# Patient Record
Sex: FEMALE | Race: WHITE | Employment: PART TIME | URBAN - METROPOLITAN AREA
[De-identification: names, ages, dates, MRNs, and addresses within clinical notes are randomized per-mention and may not be internally consistent; named-entity substitution may affect disease eponyms.]

---

## 2017-01-03 ENCOUNTER — HOSPITAL ENCOUNTER (EMERGENCY)
Facility: CLINIC | Age: 22
Discharge: HOME | End: 2017-01-03
Attending: EMERGENCY MEDICINE | Admitting: EMERGENCY MEDICINE
Payer: COMMERCIAL

## 2017-01-03 ENCOUNTER — APPOINTMENT (OUTPATIENT)
Dept: GENERAL RADIOLOGY | Facility: CLINIC | Age: 22
End: 2017-01-03
Attending: EMERGENCY MEDICINE
Payer: COMMERCIAL

## 2017-01-03 VITALS
HEIGHT: 64 IN | RESPIRATION RATE: 16 BRPM | TEMPERATURE: 97.8 F | WEIGHT: 140 LBS | OXYGEN SATURATION: 99 % | BODY MASS INDEX: 23.9 KG/M2 | HEART RATE: 78 BPM | SYSTOLIC BLOOD PRESSURE: 142 MMHG | DIASTOLIC BLOOD PRESSURE: 78 MMHG

## 2017-01-03 DIAGNOSIS — R07.9 ACUTE CHEST PAIN: ICD-10-CM

## 2017-01-03 LAB
ANION GAP SERPL CALCULATED.3IONS-SCNC: 8 MMOL/L (ref 3–14)
BASOPHILS # BLD AUTO: 0 10E9/L (ref 0–0.2)
BASOPHILS NFR BLD AUTO: 0.4 %
BUN SERPL-MCNC: 13 MG/DL (ref 7–30)
CALCIUM SERPL-MCNC: 8.6 MG/DL (ref 8.5–10.1)
CHLORIDE SERPL-SCNC: 106 MMOL/L (ref 94–109)
CO2 SERPL-SCNC: 28 MMOL/L (ref 20–32)
CREAT SERPL-MCNC: 0.75 MG/DL (ref 0.52–1.04)
DIFFERENTIAL METHOD BLD: NORMAL
EOSINOPHIL # BLD AUTO: 0.1 10E9/L (ref 0–0.7)
EOSINOPHIL NFR BLD AUTO: 1.4 %
ERYTHROCYTE [DISTWIDTH] IN BLOOD BY AUTOMATED COUNT: 11.8 % (ref 10–15)
GFR SERPL CREATININE-BSD FRML MDRD: NORMAL ML/MIN/1.7M2
GLUCOSE SERPL-MCNC: 96 MG/DL (ref 70–99)
HCG SERPL QL: NEGATIVE
HCT VFR BLD AUTO: 38.8 % (ref 35–47)
HGB BLD-MCNC: 13.3 G/DL (ref 11.7–15.7)
IMM GRANULOCYTES # BLD: 0 10E9/L (ref 0–0.4)
IMM GRANULOCYTES NFR BLD: 0.1 %
LYMPHOCYTES # BLD AUTO: 2.4 10E9/L (ref 0.8–5.3)
LYMPHOCYTES NFR BLD AUTO: 26.7 %
MCH RBC QN AUTO: 30.5 PG (ref 26.5–33)
MCHC RBC AUTO-ENTMCNC: 34.3 G/DL (ref 31.5–36.5)
MCV RBC AUTO: 89 FL (ref 78–100)
MONOCYTES # BLD AUTO: 0.7 10E9/L (ref 0–1.3)
MONOCYTES NFR BLD AUTO: 7.5 %
NEUTROPHILS # BLD AUTO: 5.7 10E9/L (ref 1.6–8.3)
NEUTROPHILS NFR BLD AUTO: 63.9 %
NRBC # BLD AUTO: 0 10*3/UL
NRBC BLD AUTO-RTO: 0 /100
PLATELET # BLD AUTO: 243 10E9/L (ref 150–450)
POTASSIUM SERPL-SCNC: 3.8 MMOL/L (ref 3.4–5.3)
RBC # BLD AUTO: 4.36 10E12/L (ref 3.8–5.2)
SODIUM SERPL-SCNC: 142 MMOL/L (ref 133–144)
WBC # BLD AUTO: 9 10E9/L (ref 4–11)

## 2017-01-03 PROCEDURE — 71020 XR CHEST 2 VW: CPT

## 2017-01-03 PROCEDURE — 93005 ELECTROCARDIOGRAM TRACING: CPT

## 2017-01-03 PROCEDURE — 84703 CHORIONIC GONADOTROPIN ASSAY: CPT | Performed by: EMERGENCY MEDICINE

## 2017-01-03 PROCEDURE — 85025 COMPLETE CBC W/AUTO DIFF WBC: CPT | Performed by: EMERGENCY MEDICINE

## 2017-01-03 PROCEDURE — 80048 BASIC METABOLIC PNL TOTAL CA: CPT | Performed by: EMERGENCY MEDICINE

## 2017-01-03 PROCEDURE — 99285 EMERGENCY DEPT VISIT HI MDM: CPT | Mod: 25

## 2017-01-03 ASSESSMENT — ENCOUNTER SYMPTOMS
CHILLS: 0
FEVER: 0
TROUBLE SWALLOWING: 0
SORE THROAT: 0
ABDOMINAL PAIN: 0
WEAKNESS: 0
WOUND: 0
NUMBNESS: 1
RHINORRHEA: 0
COUGH: 0
SHORTNESS OF BREATH: 0

## 2017-01-03 NOTE — ED AVS SNAPSHOT
[facility] Emergency Department    [address]    [facility] [address]    Phone:  [phone]    Fax:  [phone]                                       Brittney Boykin   MRN: [id number]    Department:  [facility] Emergency Department   Date of Visit:  1/3/2017           After Visit Summary Signature Page     I have received my discharge instructions, and my questions have been answered. I have discussed any challenges I see with this plan with the nurse or doctor.    ..........................................................................................................................................  Patient/Patient Representative Signature      ..........................................................................................................................................  Patient Representative Print Name and Relationship to Patient    ..................................................               ................................................  Date                                            Time    ..........................................................................................................................................  Reviewed by Signature/Title    ...................................................              ..............................................  Date                                                            Time

## 2017-01-03 NOTE — ED PROVIDER NOTES
History     Chief Complaint:  Chest Pain    HPI   Brittney Boykin is a 21 year old otherwise healthy female who presents to the emergency department today for evaluation of chest pain. The patient reports 3-4 days ago she developed left sided chest pain that radiates down her right arm as well as tingling in her right fingers. Her symptoms have been intermittent and last for approximately 15-20 minutes at a time and is worse after eating a mean. Upon arrival, she is completely asymptomatic. She states she presents to the ED since the pain is in her chest. There is no change with palpation and she denies leg or arm swelling, fever, chills, cough, cold symptoms, abdominal pain, weakness, and any abnormal lumps on her breast or skin.     Cardiac/PE/DVT Risk Factors:  History of hypertension - Negative  History of hyperlipidemia - Negative  History of diabetes - Negative  History of smoking - Negative  Hormones - Negative  Personal history of PE/DVT - Negative  Family history of PE/DVT - Negative  Family history of heart complications - Negative  Recent travel - Negative  Recent surgery - Negative  Other immobilizations - Negative  Cancer - Negative    Allergies:  No Known Drug Allergies     Medications:    The patient is currently on no regular medications.     Past Medical History:    Vitamin D deficiency      Past Surgical History:    History reviewed. No pertinent past surgical history.    Family History:    History reviewed. No pertinent family history.     Social History:  The patient was accompanied to the ED by her boyfriend.  Smoking Status: Never  Alcohol Use: Yes  Marital Status:  Single      Review of Systems   Constitutional: Negative for fever and chills.   HENT: Negative for congestion, rhinorrhea, sneezing, sore throat and trouble swallowing.    Respiratory: Negative for cough and shortness of breath.    Cardiovascular: Positive for chest pain (Radiate left arm).   Gastrointestinal: Negative for  "abdominal pain.   Skin: Negative for rash and wound.   Neurological: Positive for numbness (Tinglignn right fingers). Negative for weakness.   All other systems reviewed and are negative.    Physical Exam   First Vitals:  BP: (!) 150/93 mmHg  Pulse: 93  Temp: 97.8  F (36.6  C)  Resp: 20  Height: 162.6 cm (5' 4\")  Weight: 63.504 kg (140 lb)  SpO2: 100 %      Physical Exam  General: Patient is alert and interactive when I enter the room  Head:  The scalp, face, and head appear normal  Eyes:  The pupils are equal, round, and reactive to light    Conjunctivae and sclerae are normal  ENT:    External acoustic canals are normal    The oropharynx is normal without erythema.     Uvula is in the midline  Neck:  Normal range of motion  CV:  Regular rate. S1/S2. No murmurs.     Peripheral pulses including radial pulses are symmetric  Resp:  Lungs are clear without wheezes or rales. No distress  GI:  Abdomen is soft, no rigidity, guarding, or rebound    No distension. No tenderness to palpation in any quadrant.     MS:  Normal tone. Joints grossly normal without effusions.     No asymmetric leg swelling, calf or thigh tenderness.      Normal motor assessment of all extremities.    Chest wall is non tender to palpation.      No arm swelling. Pulses normal. Capillary refill 2 seconds.   Skin:  No rash or lesions noted. Normal capillary refill noted  Neuro: Speech is normal and fluent. Face is symmetric.     Moving all extremities well.   Psych:  Awake. Alert.  Normal affect.  Appropriate interactions.  Lymph: No anterior cervical lymphadenopathy noted    Emergency Department Course     ECG:  ECG taken at 1700, ECG read at 1723  Normal sinus rhythm  Septal infarct age undetermined  Abnormal ECG  Rate 96 bpm. NJ interval 152. QRS duration 82. QT/QTc 344/434. P-R-T axes 19 68 37.    Imaging:  Radiology findings were communicated with the patient who voiced understanding of the findings.  XR Chest 2 Views  IMPRESSION: Clear " lungs.  Final reading per radiology    Laboratory:  Laboratory findings were communicated with the patient who voiced understanding of the findings.  CBC: WNL. (WBC 9.0, HGB 13.3, )   BMP: AWNL (Creatinine 0.75)  HCG Qualitative Serum: Negative    Emergency Department Course:  Nursing notes and vitals reviewed.  I performed an exam of the patient as documented above.   The patient was sent for a XR Chest 2 Views while in the emergency department, results above.   IV was inserted and blood was drawn for laboratory testing, results above.  1720: I initially examined the patient.   1840: Patient rechecked and updated on the imaging and laboratory results.   I discussed the treatment plan with the patient. They expressed understanding of this plan and consented to discharge. They will be discharged home with instructions for care and follow up. In addition, the patient will return to the emergency department if their symptoms persist, worsen, if new symptoms arise or if there is any concern.  All questions were answered.  I personally reviewed the laboratory and imaging results with the Patient and significant other and answered all related questions prior to discharge.    Impression & Plan      Medical Decision Making:  Brittney Boykin presents with chest pain.  The work up in the Emergency Department is negative.  The differential diagnosis of chest pain is broad and includes life threatening etiologies such as Acute coronary syndrome, Myocardial infarction, Pulmonary Embolism, and Acute Aortic Dissection.  Other causes may include pneumonia, pneumothorax, pericarditis, pleurisy, and esophageal spasm.  No serious etiology for the chest pain was detected today during this visit.  PERC negative.     Close follow up with primary care is indicated should the pain continue, as further work up may be performed; this was made clear to Brittney, who understands.    Diagnosis:    ICD-10-CM    1. Acute chest pain R07.9       Disposition:   Discharged to home    Scribe Disclosure:  I, Lawanda Ulloafredis, am serving as a scribe at 5:22 PM on 1/3/2017 to document services personally performed by Jluis Lucero MD, based on my observations and the provider's statements to me.    1/3/2017   [facility] EMERGENCY DEPARTMENT        Jluis Lucero MD  01/03/17 1950

## 2017-01-03 NOTE — ED NOTES
Chest pain intermittent left sided radiates into shoulder, minimal pain on arrival. No medical hx.

## 2017-01-03 NOTE — ED AVS SNAPSHOT
[facility] Emergency Department    [address] [place] [address]    Phone:  [phone]    Fax:  [phone]                                       Brittney Boykin   MRN: [id number]    Department:  [facility] Emergency Department   Date of Visit:  1/3/2017           Patient Information     Date Of Birth          1995        Your diagnoses for this visit were:     Acute chest pain        You were seen by Jluis Lucero MD.      Follow-up Information     Follow up with [facility], [place].    Specialty:  Family Practice    Why:  If symptoms worsen    Contact information:    [address] [place] [address]  [phone]          Discharge Instructions       Discharge Instructions  Chest Pain    You have been seen today for chest pain or discomfort.  At this time, your doctor has found no signs that your chest pain is due to a serious or life-threatening condition, (or you have declined more testing and/or admission to the hospital). However, sometimes there is a serious problem that does not show up right away. Your evaluation today may not be complete and you may need further testing and evaluation.     You need to follow-up with your regular doctor within 3 days.    Return to the Emergency Department if:    Your chest pain changes, gets worse, starts to happen more often, or comes with less activity.    You are short of breath.    You get very weak or tired.    You pass out or faint.    You have any new symptoms, like fever, cough, numb legs, or you cough up blood.    You have anything else that worries you.    Until you follow-up with your regular doctor please do the following:    Take one aspirin daily unless you have an allergy or are told not to by your doctor.    If a stress test appointment has been made, go to the appointment.    If you have questions, contact your regular doctor.    If your doctor today has told you to follow-up  with your regular doctor, it is very important that you make an appointment with your clinic and go to the appointment.  If you do not follow-up with your primary doctor, it may result in missing an important development which could result in permanent injury or disability and/or lasting pain.  If there is any problem keeping your appointment, call your doctor or return to the Emergency Department.    If you were given a prescription for medicine here today, be sure to read all of the information (including the package insert) that comes with your prescription.  This will include important information about the medicine, its side effects, and any warnings that you need to know about.  The pharmacist who fills the prescription can provide more information and answer questions you may have about the medicine.  If you have questions or concerns that the pharmacist cannot address, please call or return to the Emergency Department.           24 Hour Appointment Hotline       To make an appointment at any [facility], call [phone] ([phone]). If you don't have a family doctor or clinic, we will help you find one. [facility] are conveniently located to serve the needs of you and your family.             Review of your medicines      Notice     You have not been prescribed any medications.            Procedures and tests performed during your visit     Basic metabolic panel    CBC with platelets differential    EKG 12 lead    HCG qualitative    XR Chest 2 Views      Orders Needing Specimen Collection     None      Pending Results     Date and Time Order Name Status Description    1/3/2017 1728 XR Chest 2 Views Preliminary     1/3/2017 1710 EKG 12 lead Preliminary             Pending Culture Results     No orders found from 1/2/2017 to 1/4/2017.       Test Results from your hospital stay           1/3/2017  5:54 PM - Interface, Flexilab Results      Component Results     Component Value Ref Range &  Units Status    WBC 9.0 4.0 - 11.0 10e9/L Final    RBC Count 4.36 3.8 - 5.2 10e12/L Final    Hemoglobin 13.3 11.7 - 15.7 g/dL Final    Hematocrit 38.8 35.0 - 47.0 % Final    MCV 89 78 - 100 fl Final    MCH 30.5 26.5 - 33.0 pg Final    MCHC 34.3 31.5 - 36.5 g/dL Final    RDW 11.8 10.0 - 15.0 % Final    Platelet Count 243 150 - 450 10e9/L Final    Diff Method Automated Method  Final    % Neutrophils 63.9 % Final    % Lymphocytes 26.7 % Final    % Monocytes 7.5 % Final    % Eosinophils 1.4 % Final    % Basophils 0.4 % Final    % Immature Granulocytes 0.1 % Final    Nucleated RBCs 0 0 /100 Final    Absolute Neutrophil 5.7 1.6 - 8.3 10e9/L Final    Absolute Lymphocytes 2.4 0.8 - 5.3 10e9/L Final    Absolute Monocytes 0.7 0.0 - 1.3 10e9/L Final    Absolute Eosinophils 0.1 0.0 - 0.7 10e9/L Final    Absolute Basophils 0.0 0.0 - 0.2 10e9/L Final    Abs Immature Granulocytes 0.0 0 - 0.4 10e9/L Final    Absolute Nucleated RBC 0.0  Final         1/3/2017  6:11 PM - Interface, Flexilab Results      Component Results     Component Value Ref Range & Units Status    Sodium 142 133 - 144 mmol/L Final    Potassium 3.8 3.4 - 5.3 mmol/L Final    Chloride 106 94 - 109 mmol/L Final    Carbon Dioxide 28 20 - 32 mmol/L Final    Anion Gap 8 3 - 14 mmol/L Final    Glucose 96 70 - 99 mg/dL Final    Urea Nitrogen 13 7 - 30 mg/dL Final    Creatinine 0.75 0.52 - 1.04 mg/dL Final    GFR Estimate >90  Non  GFR Calc   >60 mL/min/1.7m2 Final    GFR Estimate If Black >90   GFR Calc   >60 mL/min/1.7m2 Final    Calcium 8.6 8.5 - 10.1 mg/dL Final         1/3/2017  6:25 PM - Interface, Radiant Ib      Narrative     CHEST TWO VIEW 1/3/2017 6:17 PM     COMPARISON: None.    HISTORY: Evaluate for infiltrate, widened mediastinum, other  abnormality.    FINDINGS: The cardiac silhouette, pulmonary vasculature, lungs and  pleural spaces are within normal limits.        Impression     IMPRESSION: Clear lungs.         1/3/2017  6:18  PM - Interface, Flexilab Results      Component Results     Component Value Ref Range & Units Status    HCG Qualitative Serum Negative NEG Final                Clinical Quality Measure: Blood Pressure Screening     Your blood pressure was checked while you were in the emergency department today. The last reading we obtained was  BP: (!) 150/93 mmHg . Please read the guidelines below about what these numbers mean and what you should do about them.  If your systolic blood pressure (the top number) is less than 120 and your diastolic blood pressure (the bottom number) is less than 80, then your blood pressure is normal. There is nothing more that you need to do about it.  If your systolic blood pressure (the top number) is 120-139 or your diastolic blood pressure (the bottom number) is 80-89, your blood pressure may be higher than it should be. You should have your blood pressure rechecked within a year by a primary care provider.  If your systolic blood pressure (the top number) is 140 or greater or your diastolic blood pressure (the bottom number) is 90 or greater, you may have high blood pressure. High blood pressure is treatable, but if left untreated over time it can put you at risk for heart attack, stroke, or kidney failure. You should have your blood pressure rechecked by a primary care provider within the next 4 weeks.  If your provider in the emergency department today gave you specific instructions to follow-up with your doctor or provider even sooner than that, you should follow that instruction and not wait for up to 4 weeks for your follow-up visit.        Thank you for choosing [facility]       Thank you for choosing [facility] for your care. Our goal is always to provide you with excellent care. Hearing back from our patients is one way we can continue to improve our services. Please take a few minutes to complete the written survey that you may receive in the mail after you visit with us. Thank you!       "  MyChart Information     [employer] lets you send messages to your doctor, view your test results, renew your prescriptions, schedule appointments and more. To sign up, go to [email or web address] . Click on \"Log in\" on the left side of the screen, which will take you to the Welcome page. Then click on \"Sign up Now\" on the right side of the page.     You will be asked to enter the access code listed below, as well as some personal information. Please follow the directions to create your username and password.     Your access code is: [email or web address]  Expires: 4/3/2017  6:43 PM     Your access code will  in 90 days. If you need help or a new code, please call your [facility] clinic or [phone].        Care EveryWhere ID     This is your Care EveryWhere ID. This could be used by other organizations to access your [facility] medical records  [id number]        After Visit Summary       This is your record. Keep this with you and show to your community pharmacist(s) and doctor(s) at your next visit.                  "

## 2017-01-04 LAB — INTERPRETATION ECG - MUSE: NORMAL

## 2017-01-04 NOTE — DISCHARGE INSTRUCTIONS
Discharge Instructions  Chest Pain    You have been seen today for chest pain or discomfort.  At this time, your doctor has found no signs that your chest pain is due to a serious or life-threatening condition, (or you have declined more testing and/or admission to the hospital). However, sometimes there is a serious problem that does not show up right away. Your evaluation today may not be complete and you may need further testing and evaluation.     You need to follow-up with your regular doctor within 3 days.    Return to the Emergency Department if:    Your chest pain changes, gets worse, starts to happen more often, or comes with less activity.    You are short of breath.    You get very weak or tired.    You pass out or faint.    You have any new symptoms, like fever, cough, numb legs, or you cough up blood.    You have anything else that worries you.    Until you follow-up with your regular doctor please do the following:    Take one aspirin daily unless you have an allergy or are told not to by your doctor.    If a stress test appointment has been made, go to the appointment.    If you have questions, contact your regular doctor.    If your doctor today has told you to follow-up with your regular doctor, it is very important that you make an appointment with your clinic and go to the appointment.  If you do not follow-up with your primary doctor, it may result in missing an important development which could result in permanent injury or disability and/or lasting pain.  If there is any problem keeping your appointment, call your doctor or return to the Emergency Department.    If you were given a prescription for medicine here today, be sure to read all of the information (including the package insert) that comes with your prescription.  This will include important information about the medicine, its side effects, and any warnings that you need to know about.  The pharmacist who fills the prescription can  provide more information and answer questions you may have about the medicine.  If you have questions or concerns that the pharmacist cannot address, please call or return to the Emergency Department.

## 2019-01-01 ENCOUNTER — HOSPITAL ENCOUNTER (EMERGENCY)
Facility: CLINIC | Age: 24
Discharge: HOME | End: 2019-01-01
Attending: EMERGENCY MEDICINE | Admitting: EMERGENCY MEDICINE
Payer: COMMERCIAL

## 2019-01-01 VITALS
TEMPERATURE: 97.1 F | OXYGEN SATURATION: 99 % | BODY MASS INDEX: 33.32 KG/M2 | RESPIRATION RATE: 18 BRPM | DIASTOLIC BLOOD PRESSURE: 97 MMHG | HEART RATE: 92 BPM | HEIGHT: 65 IN | SYSTOLIC BLOOD PRESSURE: 146 MMHG | WEIGHT: 200 LBS

## 2019-01-01 DIAGNOSIS — R11.2 NON-INTRACTABLE VOMITING WITH NAUSEA, UNSPECIFIED VOMITING TYPE: ICD-10-CM

## 2019-01-01 LAB
ANION GAP SERPL CALCULATED.3IONS-SCNC: 5 MMOL/L (ref 3–14)
BUN SERPL-MCNC: 7 MG/DL (ref 7–30)
CALCIUM SERPL-MCNC: 8.5 MG/DL (ref 8.5–10.1)
CHLORIDE SERPL-SCNC: 109 MMOL/L (ref 94–109)
CO2 SERPL-SCNC: 27 MMOL/L (ref 20–32)
CREAT SERPL-MCNC: 0.8 MG/DL (ref 0.52–1.04)
ERYTHROCYTE [DISTWIDTH] IN BLOOD BY AUTOMATED COUNT: 12.1 % (ref 10–15)
GFR SERPL CREATININE-BSD FRML MDRD: >90 ML/MIN/{1.73_M2}
GLUCOSE SERPL-MCNC: 103 MG/DL (ref 70–99)
HCG SERPL QL: NEGATIVE
HCT VFR BLD AUTO: 42.3 % (ref 35–47)
HGB BLD-MCNC: 14.6 G/DL (ref 11.7–15.7)
MCH RBC QN AUTO: 30.4 PG (ref 26.5–33)
MCHC RBC AUTO-ENTMCNC: 34.5 G/DL (ref 31.5–36.5)
MCV RBC AUTO: 88 FL (ref 78–100)
PLATELET # BLD AUTO: 311 10E9/L (ref 150–450)
POTASSIUM SERPL-SCNC: 3.9 MMOL/L (ref 3.4–5.3)
RBC # BLD AUTO: 4.8 10E12/L (ref 3.8–5.2)
SODIUM SERPL-SCNC: 141 MMOL/L (ref 133–144)
WBC # BLD AUTO: 7.4 10E9/L (ref 4–11)

## 2019-01-01 PROCEDURE — 99285 EMERGENCY DEPT VISIT HI MDM: CPT | Mod: 25

## 2019-01-01 PROCEDURE — 84703 CHORIONIC GONADOTROPIN ASSAY: CPT | Performed by: EMERGENCY MEDICINE

## 2019-01-01 PROCEDURE — 80048 BASIC METABOLIC PNL TOTAL CA: CPT | Performed by: EMERGENCY MEDICINE

## 2019-01-01 PROCEDURE — 25000131 ZZH RX MED GY IP 250 OP 636 PS 637: Performed by: EMERGENCY MEDICINE

## 2019-01-01 PROCEDURE — 96361 HYDRATE IV INFUSION ADD-ON: CPT

## 2019-01-01 PROCEDURE — 96374 THER/PROPH/DIAG INJ IV PUSH: CPT

## 2019-01-01 PROCEDURE — 25000128 H RX IP 250 OP 636: Performed by: EMERGENCY MEDICINE

## 2019-01-01 PROCEDURE — 85027 COMPLETE CBC AUTOMATED: CPT | Performed by: EMERGENCY MEDICINE

## 2019-01-01 PROCEDURE — 96375 TX/PRO/DX INJ NEW DRUG ADDON: CPT

## 2019-01-01 RX ORDER — ONDANSETRON 4 MG/1
4 TABLET, ORALLY DISINTEGRATING ORAL ONCE
Status: COMPLETED | OUTPATIENT
Start: 2019-01-01 | End: 2019-01-01

## 2019-01-01 RX ORDER — SODIUM CHLORIDE 9 MG/ML
1000 INJECTION, SOLUTION INTRAVENOUS CONTINUOUS
Status: DISCONTINUED | OUTPATIENT
Start: 2019-01-01 | End: 2019-01-01 | Stop reason: HOSPADM

## 2019-01-01 RX ORDER — ONDANSETRON 4 MG/1
4 TABLET, ORALLY DISINTEGRATING ORAL EVERY 6 HOURS PRN
Qty: 15 TABLET | Refills: 0 | Status: SHIPPED | OUTPATIENT
Start: 2019-01-01 | End: 2019-01-04

## 2019-01-01 RX ORDER — DIPHENHYDRAMINE HYDROCHLORIDE 50 MG/ML
25 INJECTION INTRAMUSCULAR; INTRAVENOUS ONCE
Status: COMPLETED | OUTPATIENT
Start: 2019-01-01 | End: 2019-01-01

## 2019-01-01 RX ORDER — METOCLOPRAMIDE HYDROCHLORIDE 5 MG/ML
10 INJECTION INTRAMUSCULAR; INTRAVENOUS ONCE
Status: COMPLETED | OUTPATIENT
Start: 2019-01-01 | End: 2019-01-01

## 2019-01-01 RX ORDER — ONDANSETRON 4 MG/1
4 TABLET, ORALLY DISINTEGRATING ORAL
Status: COMPLETED | OUTPATIENT
Start: 2019-01-01 | End: 2019-01-01

## 2019-01-01 RX ADMIN — SODIUM CHLORIDE 1000 ML: 9 INJECTION, SOLUTION INTRAVENOUS at 08:29

## 2019-01-01 RX ADMIN — METOCLOPRAMIDE 10 MG: 5 INJECTION, SOLUTION INTRAMUSCULAR; INTRAVENOUS at 09:12

## 2019-01-01 RX ADMIN — ONDANSETRON 4 MG: 4 TABLET, ORALLY DISINTEGRATING ORAL at 07:38

## 2019-01-01 RX ADMIN — DIPHENHYDRAMINE HYDROCHLORIDE 25 MG: 50 INJECTION INTRAMUSCULAR; INTRAVENOUS at 09:11

## 2019-01-01 RX ADMIN — SODIUM CHLORIDE 1000 ML: 9 INJECTION, SOLUTION INTRAVENOUS at 07:53

## 2019-01-01 RX ADMIN — ONDANSETRON 4 MG: 4 TABLET, ORALLY DISINTEGRATING ORAL at 09:10

## 2019-01-01 ASSESSMENT — ENCOUNTER SYMPTOMS
VOMITING: 1
FEVER: 0
DIARRHEA: 0
DIZZINESS: 1
ABDOMINAL PAIN: 0

## 2019-01-01 ASSESSMENT — MIFFLIN-ST. JEOR: SCORE: 1668.07

## 2019-01-01 NOTE — ED AVS SNAPSHOT
[facility] Emergency Department  [address]  [facility] [address]  Phone:  [phone]  Fax:  [phone]                                    Brittney Boykin   MRN: [id number]    Department:  [facility] Emergency Department   Date of Visit:  1/1/2019           After Visit Summary Signature Page    I have received my discharge instructions, and my questions have been answered. I have discussed any challenges I see with this plan with the nurse or doctor.    ..........................................................................................................................................  Patient/Patient Representative Signature      ..........................................................................................................................................  Patient Representative Print Name and Relationship to Patient    ..................................................               ................................................  Date                                   Time    ..........................................................................................................................................  Reviewed by Signature/Title    ...................................................              ..............................................  Date                                               Time          22EPIC Rev 08/18

## 2019-01-01 NOTE — ED PROVIDER NOTES
"  History     Chief Complaint:    Vomiting    HPI   Brittney Boykin is a 23 year old female who presents with vomiting. The patient reports that last night she was consuming large amount of alcohol, around 9 shots, and started to vomit around midnight. Since then, the patient has felt dizzy and is unable to keep anything down.  The patient denies diarrhea, fever, abdominal pain. She has not found any alleviating or aggravating factors. Her last menstrual cycle was 2 weeks ago and as her normal.     Allergies:  Codeine; possibly     Medications:    No current medications.     Past Medical History:    Medical history reviewed. No pertinent medical history.      Past Surgical History:    Past surgical history reviewed. No pertinent past surgical hist     Family History:    Family history reviewed. No pertinent family history.     Social History:  The patient was accompanied to the ED by friend.  Smoking Status: Never Smoker  Smokeless Tobacco: Never Used  Alcohol Use: Positive  Drug Use: Negative   Marital Status:  Single      Review of Systems   Constitutional: Negative for fever.   Gastrointestinal: Positive for vomiting. Negative for abdominal pain and diarrhea.   Neurological: Positive for dizziness.   All other systems reviewed and are negative.    Physical Exam     Patient Vitals for the past 24 hrs:   BP Temp Temp src Pulse Resp SpO2 Height Weight   01/01/19 1017 -- -- -- 92 -- -- -- --   01/01/19 0733 (!) 146/97 97.1  F (36.2  C) Temporal 119 18 99 % 1.651 m (5' 5\") 90.7 kg (200 lb)        Physical Exam    Constitutional:  Pleasant, age appropriate female holding an emesis basin to her mouth visibly nauseated.  HEENT:    Oropharynx is moist  Eyes:    Conjunctiva normal  Neck:    Supple, no meningismus.     CV:     Regular rate and rhythm.      No murmurs, rubs or gallops.     No lower extremity edema.  PULM:    Clear to auscultation bilateral.       No respiratory distress.      Good air exchange.     No " rales or wheezing.  ABD:    Soft, non-distended.       No abdominal tenderness.     Bowel sounds normal.     No pulsatile masses.       No rebound, guarding or rigidity.     No CVA tenderness.   MSK:     No gross deformity to all four extremities.   LYMPH:   No cervical lymphadenopathy.  NEURO:   Alert.  Good muscular tone, no atrophy.   Skin:    Warm, dry and intact.    Psych:    Mood is good and affect is appropriate.      Emergency Department Course     Laboratory:  Laboratory findings were communicated with the patient who voiced understanding of the findings.    CBC: WBC 7.4, HGB 14.6,   BMP: Glucose 103 (H), WNL (Creatinine 0.80)  HCG Qualitative: Negative    Interventions:  0738 Zofran 4 mg PO  0753 NS 1000 mL IV  0829 NS 1000 mL IV  0910 Zofran 4 mg PO  0911 Benadryl 25 mg IV  0912 Reglan 10 mg IV    Emergency Department Course:    0733 Nursing notes and vitals reviewed.    0751 IV was inserted and blood was drawn for laboratory testing, results above.     0836 I performed an exam of the patient as documented above.     1004 I personally reviewed the lab results with the patient and answered all related questions prior to discharge.    Impression & Plan      Medical Decision Making:  Brittney Boykin is a 23 year old female who presents to the emergency department today for evaluation of vomiting after excessive alcohol consumption last night.  Abdominal examination is benign with no concerns of intraabdominal catastrophe or obstructive findings.  No indication for advanced imaging.  Basic laboratory studies are reassuring.  Patient improved with antiemetics.  Patient safe for discharge home with Zofran as needed.  Continue oral rehydration at home.  Patient counseled on safe alcohol use.     Diagnosis:    ICD-10-CM    1. Non-intractable vomiting with nausea, unspecified vomiting type R11.2      Disposition:   The patient is discharged to home.     Discharge Medications:     Review of your medicines       START taking      Dose / Directions   ondansetron 4 MG ODT tab  Commonly known as:  ZOFRAN ODT      Dose:  4 mg  Take 1 tablet (4 mg) by mouth every 6 hours as needed for nausea  Quantity:  15 tablet  Refills:  0           Where to get your medicines      Some of these will need a paper prescription and others can be bought over the counter. Ask your nurse if you have questions.    Bring a paper prescription for each of these medications    ondansetron 4 MG ODT tab         Scribe Disclosure:  I, Orla Severson, am serving as a scribe at 8:38 AM on 1/1/2019 to document services personally performed by Brennan Mcgregor MD based on my observations and the provider's statements to me.      [facility] EMERGENCY DEPARTMENT       Brennan Mcgregor MD  01/01/19 8226

## 2019-01-01 NOTE — ED TRIAGE NOTES
Drinking last night, c/o nausea and vomiting that started around midnight. Continues to vomit. Denies diarrhea. C/o feeling dizzy with position changes

## 2019-01-01 NOTE — DISCHARGE INSTRUCTIONS
Discharge Instructions  Vomiting    You have been seen today for vomiting (throwing up). This is usually caused by a virus, but some bacteria, parasites, medicines or other medical conditions can cause similar symptoms. At this time your provider does not find that your vomiting is a sign of anything dangerous or life-threatening. However, sometimes the signs of serious illness do not show up right away. If you have new or worse symptoms, you may need to be seen again in the Emergency Department or by your primary provider. Remember that serious problems like appendicitis can start as vomiting.    Generally, every Emergency Department visit should have a follow-up clinic visit with either a primary or a specialty clinic/provider. Please follow-up as instructed by your emergency provider today.    Return to the Emergency Department if:  You keep vomiting and you are not able to keep liquids down.   You feel you are getting dehydrated, such as being very thirsty, not urinating (peeing) at least every 8-12 hours, or feeling faint or lightheaded.   You develop a new fever, or your fever continues for more than 2 days.   You have abdominal (belly pain) that seems worse than cramps, is in one spot, or is getting worse over time. Appendicitis usually causes pain in the right lower abdomen (to the right and below your belly button) so watch for pain in this location.  You have blood in your vomit or stools.   You feel very weak.  You are not starting to improve within 24 hours of your visit here.     What can I do to help myself?  The most important thing to do is to drink clear liquids. If you have been vomiting a lot, it is best to have only small, frequent sips of liquids. Drinking too much at once may cause more vomiting. If you are vomiting often, you must replace minerals, sodium and potassium lost with your illness. Pedialyte  is the best available rehydration liquid but some find that it doesn?t taste good so sports  drinks are an alterative. You can also drink clear liquids such as water, weak tea, apple juice, and 7-Up . Avoid acid liquids (orange), caffeine (coffee) or alcohol. Do not drink milk until you no longer have diarrhea (loose stools).   After liquids are staying down, you may start eating mild foods. Soda crackers, toast, plain noodles, gelatin, applesauce and bananas are good first choices. Avoid foods that have acid, are spicy, fatty or have a lot of fiber (such as meats, coarse grains, vegetables). You may start eating these foods again in about 3 days when you are better.   Sometimes treatment includes prescription medicine to prevent nausea (sick to your stomach) and vomiting. If your provider prescribes these for you, take them as directed.   Do not take ibuprofen, naproxen, or other nonsteroidal anti-inflammatory (NSAID) medicines without checking with your healthcare provider.     If you were given a prescription for medicine here today, be sure to read all of the information (including the package insert) that comes with your prescription.  This will include important information about the medicine, its side effects, and any warnings that you need to know about.  The pharmacist who fills the prescription can provide more information and answer questions you may have about the medicine.  If you have questions or concerns that the pharmacist cannot address, please call or return to the Emergency Department.     Remember that you can always come back to the Emergency Department if you are not able to see your regular provider in the amount of time listed above, if you get any new symptoms, or if there is anything that worries you.

## 2019-01-01 NOTE — ED NOTES
Patient able to tolerate PO intake. MD in to see patient and discuss diagnosis, test results, and discharge plan. Patient meets discharge criteria. Discussed AVS with patient. Questions answered. Patient verbalized understanding. Patient reports being ready to go home. Patient discharged home with friend by car with all necessary information.

## 2019-03-01 ENCOUNTER — HOSPITAL ENCOUNTER (EMERGENCY)
Facility: CLINIC | Age: 24
Discharge: HOME | End: 2019-03-02
Attending: EMERGENCY MEDICINE | Admitting: EMERGENCY MEDICINE
Payer: COMMERCIAL

## 2019-03-01 VITALS
HEART RATE: 98 BPM | OXYGEN SATURATION: 99 % | RESPIRATION RATE: 16 BRPM | WEIGHT: 180 LBS | SYSTOLIC BLOOD PRESSURE: 153 MMHG | BODY MASS INDEX: 29.95 KG/M2 | DIASTOLIC BLOOD PRESSURE: 101 MMHG | TEMPERATURE: 98.3 F

## 2019-03-01 DIAGNOSIS — H66.002 ACUTE SUPPURATIVE OTITIS MEDIA OF LEFT EAR WITHOUT SPONTANEOUS RUPTURE OF TYMPANIC MEMBRANE, RECURRENCE NOT SPECIFIED: ICD-10-CM

## 2019-03-01 PROCEDURE — 99282 EMERGENCY DEPT VISIT SF MDM: CPT

## 2019-03-01 RX ORDER — LEVOTHYROXINE SODIUM 25 UG/1
25 TABLET ORAL DAILY
COMMUNITY

## 2019-03-01 NOTE — ED AVS SNAPSHOT
[facility] Emergency Department  [address]  [facility] [address]  Phone:  [phone]  Fax:  [phone]                                    Brittney Boykin   MRN: [id number]    Department:  [facility] Emergency Department   Date of Visit:  3/1/2019           After Visit Summary Signature Page    I have received my discharge instructions, and my questions have been answered. I have discussed any challenges I see with this plan with the nurse or doctor.    ..........................................................................................................................................  Patient/Patient Representative Signature      ..........................................................................................................................................  Patient Representative Print Name and Relationship to Patient    ..................................................               ................................................  Date                                   Time    ..........................................................................................................................................  Reviewed by Signature/Title    ...................................................              ..............................................  Date                                               Time          22EPIC Rev 08/18

## 2019-03-02 RX ORDER — AMOXICILLIN 500 MG/1
500 CAPSULE ORAL 3 TIMES DAILY
Qty: 21 CAPSULE | Refills: 0 | Status: SHIPPED | OUTPATIENT
Start: 2019-03-02 | End: 2019-03-09

## 2019-03-02 NOTE — ED PROVIDER NOTES
History     Chief Complaint:  Otalgia      HPI   Brittney Boykin is a 23 year old female who presents for evaluation of left sided otalgia for the past 3 days and has gotten progressively worse. The pain used to remain in one location but now radiates down her left mandible. No  Fever or drainage from her left ear and she has noticed that it more difficult to hear out of her left ear as well. Of note, the patient used to get frequent ear infections as a child but never had tubes placed     Allergies:  Codeine     Medications:    Levothyroxine  Vitamin D3    Past Medical History:    Hyperthyroidism  FEDERICO  Vitamin D deficiency    Past Surgical History:    History reviewed. No pertinent surgical history.    Family History:    Cancer   Hypertension    Social History:  Smoking status: Never smoker  Alcohol use: Yes  Marital Status:  Single [1]       Review of Systems   HENT: Positive for ear pain. Negative for ear discharge.    All other systems reviewed and are negative.        Physical Exam     Patient Vitals for the past 24 hrs:   BP Temp Temp src Pulse Heart Rate Resp SpO2 Weight   03/01/19 2335 -- -- -- -- -- -- 99 % 81.6 kg (180 lb)   03/01/19 2252 (!) 153/101 98.3  F (36.8  C) Temporal 98 98 16 99 % --         Physical Exam   Constitutional: She appears well-developed and well-nourished.   HENT:   Right Ear: External ear normal.   Left Ear: External ear normal.   Nose: Nose normal.   Mouth/Throat: Oropharynx is clear and moist.   L EAC filled with yellow waxy debris, unable to see TM initially.  After irrigation, RM hazy appearing with some mild redness around the edge. No TTP over mastoid.   Eyes: Conjunctivae and EOM are normal. Pupils are equal, round, and reactive to light.   Neck: Normal range of motion. Neck supple.   Cardiovascular: Normal rate, regular rhythm, normal heart sounds and intact distal pulses.   No murmur heard.  Pulmonary/Chest: Effort normal and breath sounds normal. No respiratory  distress.   Lymphadenopathy:     She has cervical adenopathy.   Neurological: She is alert.   Skin: Skin is warm and dry.   Psychiatric: She has a normal mood and affect.         Emergency Department Course       Emergency Department Course:  Past medical records, nursing notes, and vitals reviewed.  2340: I performed an exam of the patient and obtained history, as documented above.    0025: I rechecked the patient. Findings and plan explained to the Patient. Patient discharged home with instructions regarding supportive care, medications, and reasons to return. The importance of close follow-up was reviewed.          Impression & Plan      Medical Decision Making:  Patient presents with left ear pain for 3 days. On exam she is non toxic appearing, I do not see any evidence of otitis externa, there is no evidence or mastoiditis as she is not tender in those particular areas. She has large amounts of debris blocking the view of her left TM. We irrigated it and she felt that her hearing was improved after that. Her left TM was slightly erythematous on the edge and there is fluid behind there that was somewhat opaque so likely she may have an infection as well as fluid build up. I did recommend using sudafed if she feels the need to do that. Benadryl at night and she is asked to start amoxicillin today and do it for 7 days. She need to be rechecked by her doctor in 3-4 days if symptoms are not improving after medication. Otherwise patient is comfortable with starting to use motrin and tylenol for pain as well amoxicillin. Diagnosis left otitis media       Diagnosis:    ICD-10-CM    1. Acute suppurative otitis media of left ear without spontaneous rupture of tympanic membrane, recurrence not specified H66.002        Disposition:  discharged to home    Discharge Medications:     Medication List      Started    amoxicillin 500 MG capsule  Commonly known as:  AMOXIL  500 mg, Oral, 3 TIMES DAILY           Richard  Altagracia  3/1/2019   [facility] EMERGENCY DEPARTMENT    Scribe Disclosure:  I, Richard Frias, am serving as a scribe at 11:40 PM on 3/1/2019 to document services personally performed by Smith Vega MD based on my observations and the provider's statements to me.          Smith Vega MD  03/02/19 0719

## 2020-02-16 ENCOUNTER — HOSPITAL ENCOUNTER (EMERGENCY)
Facility: CLINIC | Age: 25
Discharge: HOME | End: 2020-02-17
Attending: EMERGENCY MEDICINE | Admitting: EMERGENCY MEDICINE
Payer: COMMERCIAL

## 2020-02-16 DIAGNOSIS — H66.92 LEFT OTITIS MEDIA, UNSPECIFIED OTITIS MEDIA TYPE: ICD-10-CM

## 2020-02-16 PROCEDURE — 99282 EMERGENCY DEPT VISIT SF MDM: CPT

## 2020-02-16 NOTE — ED AVS SNAPSHOT
[facility] Emergency Department  [address]  [facility] [address]  Phone:  [phone]  Fax:  [phone]                                    Brittney Boykin   MRN: [id number]    Department:  [facility] Emergency Department   Date of Visit:  2/16/2020           After Visit Summary Signature Page    I have received my discharge instructions, and my questions have been answered. I have discussed any challenges I see with this plan with the nurse or doctor.    ..........................................................................................................................................  Patient/Patient Representative Signature      ..........................................................................................................................................  Patient Representative Print Name and Relationship to Patient    ..................................................               ................................................  Date                                   Time    ..........................................................................................................................................  Reviewed by Signature/Title    ...................................................              ..............................................  Date                                               Time          22EPIC Rev 08/18

## 2020-02-17 VITALS
HEART RATE: 71 BPM | RESPIRATION RATE: 18 BRPM | BODY MASS INDEX: 36.06 KG/M2 | TEMPERATURE: 98.2 F | WEIGHT: 216.71 LBS | DIASTOLIC BLOOD PRESSURE: 90 MMHG | SYSTOLIC BLOOD PRESSURE: 154 MMHG | OXYGEN SATURATION: 98 %

## 2020-02-17 RX ORDER — CEFDINIR 300 MG/1
300 CAPSULE ORAL 2 TIMES DAILY
Qty: 14 CAPSULE | Refills: 0 | Status: SHIPPED | OUTPATIENT
Start: 2020-02-17 | End: 2020-05-06

## 2020-02-17 RX ORDER — IBUPROFEN 200 MG
600 TABLET ORAL EVERY 6 HOURS PRN
Qty: 30 TABLET | Refills: 0 | Status: SHIPPED | OUTPATIENT
Start: 2020-02-17 | End: 2020-05-06

## 2020-02-17 ASSESSMENT — ENCOUNTER SYMPTOMS
RHINORRHEA: 0
COUGH: 0
FEVER: 0

## 2020-02-17 NOTE — ED TRIAGE NOTES
Pt arrives to the ED due to left ear pain. Pt states that she saw a virtual MD and they prescribed ear drops for ear infection. Has been taking for 4 days. Pain increasing. Ibuprofen an hour ago.

## 2020-02-17 NOTE — ED PROVIDER NOTES
History     Chief Complaint:  Otalgia      The history is provided by the patient.      Brittney Boykin is a 24 year old female who presents with a friend for evaluation of otalgia. The patient has left ear otalgia which she believes to be an ear infection because this episode is similar to past ear infections. She has been using Ofloxacin drops the past few days without relief. She denies fever, cough, rhinorrhea, or ear trauma.  She has not taken anything for analgesia.    Allergies:  Codeine    Medications:    Levothyroxine   Pepcid    Past Medical History:    Anxiety  Depression  Hypothyroidism  Obesity  Vitamin D deficiency  Otitis media    Past Surgical History:    [place] teeth extraction    Family History:    Deafness  Hypertension  Hyperlipidemia  Prostate cancer    Social History:  Marital Status:  Single [1]  Presents with a friend.  Negative for tobacco use.  Positive for alcohol use.   Negative for drug use.     Review of Systems   Constitutional: Negative for fever.   HENT: Positive for ear pain. Negative for rhinorrhea.    Respiratory: Negative for cough.    All other systems reviewed and are negative.    Physical Exam     Patient Vitals for the past 24 hrs:   BP Temp Temp src Pulse Heart Rate Resp SpO2 Weight   02/16/20 2329 (!) 161/100 97.9  F (36.6  C) Oral 79 79 18 99 % 98.3 kg (216 lb 11.4 oz)       Physical Exam  Nursing note and vitals reviewed.  Constitutional: Well nourished. Resting comfortably.   Eyes: Conjunctiva normal.  Pupils are equal, round, and reactive to light.   ENT: Nose normal. Mucous membranes pink and moist.  L. TM with erythema and slight effusion; no external ear tenderness/otorrhea; R. TM normal. No mastoid tenderness.  Uvula midline. No posterior oropharyngeal erythema/exudate.   Neck: Normal range of motion.  CVS: Normal rate, regular rhythm.  Normal heart sounds.  No murmur.  Pulmonary: Lungs clear to auscultation bilaterally. No wheezes/rales/rhonchi.  GI: Abdomen  "soft. Nontender, nondistended. No rigidity or guarding.    MSK: No calf tenderness or swelling.  Neuro: Alert. Follows simple commands.  Skin: Skin is warm and dry. No rash noted.   Psychiatric: Normal affect.       Emergency Department Course     Procedures:  None    Emergency Department Course:  Past medical records, nursing notes, and vitals reviewed.    2346 I performed an exam of the patient as documented above.     Findings and plan explained to the Patient. Patient discharged home with instructions regarding supportive care, medications, and reasons to return. The importance of close follow-up was reviewed. The patient was prescribed cefdinir and ibuprofen.    I personally answered all related questions prior to discharge.     Impression & Plan     Medical Decision Making:  Patient presents with L. Ear pain.  Nontoxic.  The patient has an exam consistent with acute otitis media.  There is no sign of mastoiditis, mass, dental abscess, or peritonsillar abscess. There is no evidence of otitis externa.  The patient has been on ofloxacin gtts, will be started on antibiotics and may take Tylenol or Ibuprofen for pain.  Patient reports she \"never responds\" to amoxicillin.  She reports she typically responds to cefdinir.  Return if increasing pain, fever, decrease in hearing or ear discharge that persists.  Follow-up with primary physician in 3-5 days, if symptoms persist.       Diagnosis:    ICD-10-CM    1. Left otitis media, unspecified otitis media type H66.92        Disposition:  Discharged to home.    Discharge Medications:  New Prescriptions    CEFDINIR (OMNICEF) 300 MG CAPSULE    Take 1 capsule (300 mg) by mouth 2 times daily for 7 days    IBUPROFEN (ADVIL/MOTRIN) 200 MG TABLET    Take 3 tablets (600 mg) by mouth every 6 hours as needed for pain       Scribe Disclosure:  Elke LUCERO, am serving as a scribe at 11:46 PM on 2/16/2020 to document services personally performed by Deb Malcolm DO " based on my observations and the provider's statements to me.     [facility] EMERGENCY DEPARTMENT       Deb Malcolm,   02/17/20 0017

## 2020-05-06 ENCOUNTER — NURSE TRIAGE (OUTPATIENT)
Dept: NURSING | Facility: CLINIC | Age: 25
End: 2020-05-06

## 2020-05-06 ENCOUNTER — APPOINTMENT (OUTPATIENT)
Dept: MRI IMAGING | Facility: CLINIC | Age: 25
End: 2020-05-06
Attending: EMERGENCY MEDICINE
Payer: COMMERCIAL

## 2020-05-06 ENCOUNTER — HOSPITAL ENCOUNTER (EMERGENCY)
Facility: CLINIC | Age: 25
Discharge: HOME | End: 2020-05-06
Attending: EMERGENCY MEDICINE | Admitting: EMERGENCY MEDICINE
Payer: COMMERCIAL

## 2020-05-06 VITALS
SYSTOLIC BLOOD PRESSURE: 151 MMHG | HEART RATE: 92 BPM | RESPIRATION RATE: 22 BRPM | DIASTOLIC BLOOD PRESSURE: 98 MMHG | OXYGEN SATURATION: 97 %

## 2020-05-06 DIAGNOSIS — M62.81 GENERALIZED MUSCLE WEAKNESS: ICD-10-CM

## 2020-05-06 DIAGNOSIS — R42 DIZZINESS: ICD-10-CM

## 2020-05-06 LAB
ANION GAP SERPL CALCULATED.3IONS-SCNC: 4 MMOL/L (ref 3–14)
BUN SERPL-MCNC: 11 MG/DL (ref 7–30)
CALCIUM SERPL-MCNC: 8.8 MG/DL (ref 8.5–10.1)
CHLORIDE SERPL-SCNC: 106 MMOL/L (ref 94–109)
CO2 SERPL-SCNC: 29 MMOL/L (ref 20–32)
CREAT SERPL-MCNC: 0.87 MG/DL (ref 0.52–1.04)
ERYTHROCYTE [DISTWIDTH] IN BLOOD BY AUTOMATED COUNT: 12.1 % (ref 10–15)
GFR SERPL CREATININE-BSD FRML MDRD: >90 ML/MIN/{1.73_M2}
GLUCOSE BLDC GLUCOMTR-MCNC: 107 MG/DL (ref 70–99)
GLUCOSE SERPL-MCNC: 109 MG/DL (ref 70–99)
HCG SERPL QL: NEGATIVE
HCT VFR BLD AUTO: 43.3 % (ref 35–47)
HGB BLD-MCNC: 14.5 G/DL (ref 11.7–15.7)
MCH RBC QN AUTO: 29.7 PG (ref 26.5–33)
MCHC RBC AUTO-ENTMCNC: 33.5 G/DL (ref 31.5–36.5)
MCV RBC AUTO: 89 FL (ref 78–100)
PLATELET # BLD AUTO: 317 10E9/L (ref 150–450)
POTASSIUM SERPL-SCNC: 3.5 MMOL/L (ref 3.4–5.3)
RBC # BLD AUTO: 4.88 10E12/L (ref 3.8–5.2)
SODIUM SERPL-SCNC: 139 MMOL/L (ref 133–144)
WBC # BLD AUTO: 10.5 10E9/L (ref 4–11)

## 2020-05-06 PROCEDURE — 93005 ELECTROCARDIOGRAM TRACING: CPT

## 2020-05-06 PROCEDURE — 00000146 ZZHCL STATISTIC GLUCOSE BY METER IP

## 2020-05-06 PROCEDURE — 70549 MR ANGIOGRAPH NECK W/O&W/DYE: CPT

## 2020-05-06 PROCEDURE — 70553 MRI BRAIN STEM W/O & W/DYE: CPT

## 2020-05-06 PROCEDURE — 80048 BASIC METABOLIC PNL TOTAL CA: CPT | Performed by: EMERGENCY MEDICINE

## 2020-05-06 PROCEDURE — 85027 COMPLETE CBC AUTOMATED: CPT | Performed by: EMERGENCY MEDICINE

## 2020-05-06 PROCEDURE — 25000132 ZZH RX MED GY IP 250 OP 250 PS 637: Performed by: EMERGENCY MEDICINE

## 2020-05-06 PROCEDURE — 25500064 ZZH RX 255 OP 636: Performed by: EMERGENCY MEDICINE

## 2020-05-06 PROCEDURE — A9585 GADOBUTROL INJECTION: HCPCS | Performed by: EMERGENCY MEDICINE

## 2020-05-06 PROCEDURE — 99285 EMERGENCY DEPT VISIT HI MDM: CPT | Mod: 25

## 2020-05-06 PROCEDURE — 84703 CHORIONIC GONADOTROPIN ASSAY: CPT | Performed by: EMERGENCY MEDICINE

## 2020-05-06 PROCEDURE — 70544 MR ANGIOGRAPHY HEAD W/O DYE: CPT | Mod: XS

## 2020-05-06 RX ORDER — GADOBUTROL 604.72 MG/ML
10 INJECTION INTRAVENOUS ONCE
Status: COMPLETED | OUTPATIENT
Start: 2020-05-06 | End: 2020-05-06

## 2020-05-06 RX ORDER — MECLIZINE HYDROCHLORIDE 25 MG/1
25 TABLET ORAL 3 TIMES DAILY PRN
Qty: 15 TABLET | Refills: 0 | Status: SHIPPED | OUTPATIENT
Start: 2020-05-06 | End: 2021-04-25

## 2020-05-06 RX ORDER — MECLIZINE HYDROCHLORIDE 25 MG/1
25 TABLET ORAL ONCE
Status: COMPLETED | OUTPATIENT
Start: 2020-05-06 | End: 2020-05-06

## 2020-05-06 RX ADMIN — MECLIZINE HYDROCHLORIDE 25 MG: 25 TABLET ORAL at 20:50

## 2020-05-06 RX ADMIN — GADOBUTROL 10 ML: 604.72 INJECTION INTRAVENOUS at 21:02

## 2020-05-06 ASSESSMENT — ENCOUNTER SYMPTOMS
DIZZINESS: 1
WEAKNESS: 1
VOMITING: 0
FEVER: 0
NAUSEA: 0
SENSORY CHANGE: 0

## 2020-05-06 NOTE — ED AVS SNAPSHOT
[facility] Emergency Department  [address]  [facility] [address]  Phone:  [phone]  Fax:  [phone]                                    Brittney Boykin   MRN: [id number]    Department:  [facility] Emergency Department   Date of Visit:  5/6/2020           After Visit Summary Signature Page    I have received my discharge instructions, and my questions have been answered. I have discussed any challenges I see with this plan with the nurse or doctor.    ..........................................................................................................................................  Patient/Patient Representative Signature      ..........................................................................................................................................  Patient Representative Print Name and Relationship to Patient    ..................................................               ................................................  Date                                   Time    ..........................................................................................................................................  Reviewed by Signature/Title    ...................................................              ..............................................  Date                                               Time          22EPIC Rev 08/18

## 2020-05-07 LAB — INTERPRETATION ECG - MUSE: NORMAL

## 2020-05-07 NOTE — ED NOTES
Pt called at [phone] to inform her that a prescription was left behind w/ her discharge. Pt did not answer but I left a message that did not include her name or identifying information in order to maintain HIPPA. Her prescription was placed in a labeled envelope w/ her name and number, as well as day she was seen, and given to janet Vega.

## 2020-05-07 NOTE — ED PROVIDER NOTES
"  History     Chief Complaint:  Right Sided Weakness     HPI:  Brittney Boykin is a 24 year old female who presents to the emergency department for evaluation of right sided weakness. The patient reports she was finishing up dinner about 30 minutes ago when she experienced abrupt onset of dizziness. The dizziness is described as \"lightheaded\" and less room spinning.  Her symptoms increased when standing and improved while lying down, but did not completely resolve. She then developed right sided weakness, and right sided facial numbness.  She feels better in relation to the dizziness and weakness when compared to the start of the symptoms although they persist. She denies recent head or neck trauma. She denies recent chiropractic manipulation.     Allergies:  Codeine     Medications:    Levothyroxine     Past Medical History:    Hypothyroidism    Past Surgical History:    The patient does not have any pertinent past surgical history.    Family History:    No past pertinent family history.    Social History:  Smoking Status: Never Smoker  Smokeless Tobacco: Never Used  Alcohol Use: Yes  Drug Use: No  PCP: [facility], [place]    Marital Status:  Single     Review of Systems   Constitutional: Negative for fever.   HENT: Negative for hearing loss and tinnitus.    Gastrointestinal: Negative for nausea and vomiting.   Neurological: Positive for dizziness and weakness. Negative for sensory change.   All other systems reviewed and are negative.      Physical Exam     Patient Vitals for the past 24 hrs:   BP Pulse Heart Rate Resp SpO2   05/06/20 2100 (!) 152/95 103 103 -- 99 %   05/06/20 2028 (!) 192/118 121 121 22 100 %       Physical Exam    Constitutional:  Pleasant, age appropriate.      Resting comfortably in the bed.  HEENT:    Tympanic membranes are clear and without effusion.     External auditory canals without cerumen impaction.     Oropharynx is moist, without lesions or trismus.  Eyes:    Conjunctiva " normal, PERRL     Extra-ocular movements intact.     No nystagmus     Visual fields intact  Neck:    Supple, no meningismus.     CV:     Tachycardic, regular rhythm.      No murmurs, rubs or gallops.       2+ radial pulses bilateral.       No lower extremity edema.  PULM:    Clear to auscultation bilateral.       No respiratory distress.      Good air exchange.     No rales or wheezing.  ABD:    Soft, non-tender, non-distended.       No pulsatile masses.       No rebound, guarding or rigidity.  MSK:     No gross deformity to all four extremities.   LYMPH:   No cervical lymphadenopathy.  NEURO:   Alert and oriented x 3.      Cranial nerves II-XII intact.     Strength is 5/5 in all 4 extremities.     Sensation intact to all 4 extremities.     Finger to nose normal bilateral     No clonus, down-going Babinski bilateral.     Test of skew normal.  Skin:    Warm, dry and intact.    Psych:    Mild anxious    National Institutes of Health Stroke Scale  Exam Interval: Baseline   Score    Level of consciousness: (0)   Alert, keenly responsive    LOC questions: (0)   Answers both questions correctly    LOC commands: (0)   Performs both tasks correctly    Best gaze: (0)   Normal    Visual: (0)   No visual loss    Facial palsy: (0)   Normal symmetrical movements    Motor arm (left): (0)   No drift    Motor arm (right): (0)   No drift    Motor leg (left): (0)   No drift    Motor leg (right): (0)   No drift    Limb ataxia: (0)   Absent    Sensory: (0)   Normal- no sensory loss    Best language: (0)   Normal- no aphasia    Dysarthria: (0)   Normal    Extinction and inattention: (0)   No abnormality        Total Score:  0         Emergency Department Course     ECG:  ECG taken at 2033, ECG read at 2035  Normal sinus rhythm  Normal ECG  No significant change compared to EKG dated 01/03/2017.   Rate 97 bpm. UT interval 152 ms. QRS duration 86 ms. QT/QTc 346/439 ms. P-R-T axes 32 65 28.    Imaging:  Radiology findings were communicated  with the patient who voiced understanding of the findings.    MRA Neck (Carotids) wo & w Contrast  IMPRESSION:  NECK MRA:  1.  No significant stenosis of the neck vessels by NASCET criteria. No finding for dissection.    Reading per radiology.      MR Brain w/o & w Contrast  IMPRESSION:  HEAD MRI:   1.  Unremarkable appearance to the brain with no finding for acute infarct, intracranial hemorrhage, extra-axial collection, or abnormally enhancing mass. No parenchymal signal abnormality.    Reading per radiology.      MRA Brain ([place]) wo Contrast  Final Result  IMPRESSION:  HEAD MRA:   1.  No significant intracranial stenosis. No aneurysm and no high flow vascular malformation.    Reading per radiology.     Laboratory:  Laboratory findings were communicated with the patient who voiced understanding of the findings.    Glucose by meter(2034): 107 (H)    HCG Qualitative Pregnancy: negative    BMP: Glucose 109 (H), o/w WNL (Creatinine: 0.87)    CBC: WBC: 10.5, HGB: 14.5, PLT: 317     Interventions:  2050 Meclizine 25 mg oral    Emergency Department Course:  Past medical records, nursing notes, and vitals reviewed.    2025 Patient arrived and neurological evaluation completed.     2029 I performed an exam of the patient as documented above.     EKG obtained in the ED, see results above.     IV was inserted and blood was drawn for laboratory testing, results above.    The patient was sent for a MR Brain, MRA Brain, and MRA Neck while in the emergency department, results above.     2223 I rechecked the patient and discussed the results of her workup thus far.     Findings and plan explained to the Patient. Patient discharged home with instructions regarding supportive care, medications, and reasons to return. The importance of close follow-up was reviewed. The patient was prescribed meclizine.     I personally reviewed the laboratory and imaging results with the Patient and answered all related questions prior  to discharge.     Impression & Plan     Medical Decision Makin-year-old female presented to the ED with primary complaints of dizziness described as positional lightheadedness but also subjective right-sided weakness.  Objectively she has no weakness nor neurological deficit.  She has no features concerning for central vertigo with the exception of the subjective right-sided weakness.  Basic laboratory studies were unremarkable.  MRI and MRA of the head and neck revealed no concerning pathology particularly to the posterior fossa.  With meclizine, she feels much improved.  Dizziness is likely related to peripheral vertigo.  The exact cause of the subjective weakness is uncertain but very low suspicion for TIA.  Patient safe for discharge home and close follow-up with primary care physician.  Return to the ED for any worsening symptoms.    Diagnosis:    ICD-10-CM    1. Dizziness  R42    2. Generalized muscle weakness  M62.81        Disposition:  Discharged to home.    Discharge Medications:  New Prescriptions    MECLIZINE (ANTIVERT) 25 MG TABLET    Take 1 tablet (25 mg) by mouth 3 times daily as needed for dizziness       Scribe Disclosure:  I, Dayan Pryor, am serving as a scribe at 8:36 PM on 2020 to document services personally performed by Brennan Mcgregor MD based on my observations and the provider's statements to me.      Brennan Mcgregor MD  [phone]

## 2020-05-07 NOTE — TELEPHONE ENCOUNTER
Brittney calls and says that she is dizzy and says that things are spinning. Symptom began 15 minutes ago. Pt. Says that she has been drinking fluids today. Pt. Refuses to call 911 and says that she is sitting in a car in the [facility] ER parking lot. RN then told pt. That she needs to have someone get her a wheelchair so she does not fall going into the ER. Pt. voiced understanding. RN then called that ER and spoke to Melva-. RN told Melva about pt's symptoms and impending arrival. Melva says that pt. Just entered the ER. COVID 19 Nurse Triage Plan/Patient Instructions    Please be aware that novel coronavirus (COVID-19) may be circulating in the community. If you develop symptoms such as fever, cough, or SOB or if you have concerns about the presence of another infection including coronavirus (COVID-19), please contact your health care provider or visit [email or web address].     Disposition/Instructions    Patient to go to ED and follow protocol based instructions. Follow System Ambulatory Workflow for COVID 19.     Bring Your Own Device:  Please also bring your smart device(s) (smart phones, tablets, laptops) and their charging cables for your personal use and to communicate with your care team during your visit.    Thank you for limiting contact with others, wearing a simple mask to cover your cough, practice good hand hygiene habits and accessing our virtual services where possible to limit the spread of this virus.    For more information about COVID19 and options for caring for yourself at home, please visit the CDC website at https://www.cdc.gov/coronavirus/2019-ncov/about/steps-when-sick.html  For more options for care at [facility], please visit our website at [email or web address]    For more information, please use the [place] Department of Health COVID-19 Website: https://www.health.state.[place]/diseases/coronavirus/index.html  [facility] ([facility]) COVID-19 Hotlines (Interpreters available):      Health questions: Phone Number: [phone] or 1-786.367.2133 and Hours: 7 a.m. to 7 p.m.    Schools and  questions: Phone Number: [phone] or [phone] and Hours 7 a.m. to 7 p.m.                  Reason for Disposition    [1] Weakness (i.e., paralysis, loss of muscle strength) of the face, arm or leg on one side of the body AND [2] sudden onset AND [3] present now    Protocols used: DIZZINESS - VERTIGO-A-AH

## 2020-05-07 NOTE — ED NOTES
Pt says that dizziness may have gotten a little better, but still reports dizziness, she is ambulatory with steady gait. MD aware.

## 2021-04-25 ENCOUNTER — OFFICE VISIT (OUTPATIENT)
Dept: URGENT CARE | Facility: URGENT CARE | Age: 26
End: 2021-04-25
Payer: COMMERCIAL

## 2021-04-25 VITALS — DIASTOLIC BLOOD PRESSURE: 93 MMHG | TEMPERATURE: 98.6 F | HEART RATE: 106 BPM | SYSTOLIC BLOOD PRESSURE: 156 MMHG

## 2021-04-25 DIAGNOSIS — H60.393 INFECTIVE OTITIS EXTERNA, BILATERAL: Primary | ICD-10-CM

## 2021-04-25 PROCEDURE — 99203 OFFICE O/P NEW LOW 30 MIN: CPT | Performed by: FAMILY MEDICINE

## 2021-04-25 RX ORDER — CEFDINIR 300 MG/1
CAPSULE ORAL
COMMUNITY
Start: 2021-04-24

## 2021-04-25 RX ORDER — NEOMYCIN SULFATE, POLYMYXIN B SULFATE AND HYDROCORTISONE 10; 3.5; 1 MG/ML; MG/ML; [USP'U]/ML
3 SUSPENSION/ DROPS AURICULAR (OTIC) 4 TIMES DAILY
Qty: 6 ML | Refills: 0 | Status: SHIPPED | OUTPATIENT
Start: 2021-04-25 | End: 2021-05-05

## 2021-04-25 NOTE — PATIENT INSTRUCTIONS
Patient Education     External Ear Infection (Adult)    External otitis (also called  swimmer s ear ) is an infection in the ear canal. It's often caused by bacteria or fungus. It can occur a few days after water gets trapped in the ear canal (from swimming or bathing). It can also occur after cleaning too deeply in the ear canal with a cotton swab or other object. Sometimes, hair care products get into the ear canal and cause this problem.   Symptoms can include pain, fever, itching, redness, drainage, or swelling of the ear canal. Temporary hearing loss may also occur.   Home care    Don't try to clean the ear canal. This can push pus and bacteria deeper into the canal.    Use prescribed ear drops as directed. These help reduce swelling and fight the infection. If an ear wick was placed in the ear canal, apply drops right onto the end of the wick. The wick will draw the medicine into the ear canal even if it's swollen closed.    A cotton ball may be loosely placed in the outer ear to absorb any drainage.    You may use over-the-counter medicines to control pain as directed by the healthcare provider, unless another medicine was prescribed. Talk with your provider before using these medicines if you have chronic liver or kidney disease or ever had a stomach ulcer or digestive tract bleeding.    Don't allow water to get into your ear when bathing. Also don't swim until the infection has cleared.    Prevention    Keep your ears dry. This helps lower the risk of infection. Dry your ears with a towel or hair dryer after getting wet. Also, use ear plugs when swimming.    Don't stick any objects in the ear to remove wax.    Talk with your provider about using ear drops to prevent swimmer's ear in case you feel water trapped in your ear canal. You can get these drops over the counter at most drugstores. They work by removing water from the ear canal.    Follow-up care  Follow up with your healthcare provider in 1 week,  or as advised.   When to seek medical advice  Call your healthcare provider right away if any of these occur:     Ear pain becomes worse or doesn t improve after 3 days of treatment    Redness or swelling of the outer ear occurs or gets worse    Headache    Fever of 100.4 F (38 C) or higher, or as directed by your healthcare provider  Call 911  Call 911 or get immediate medical care if any of the following occur:     Seizure    Unusual drowsiness or confusion    Unusual painful or stiff neck    Bailey last reviewed this educational content on 8/1/2020 2000-2021 [employer]. All rights reserved. This information is not intended as a substitute for professional medical care. Always follow your healthcare professional's instructions.

## 2021-04-25 NOTE — PROGRESS NOTES
SUBJECTIVE:Brittney Boykin is a 25 year old female who presents with bilateral ear painfor day(s).   Severity: moderate   Timing:still present  Additional symptoms include none.    History of recurrent otitis: yes    No past medical history on file.  Allergies   Allergen Reactions     Codeine GI Disturbance     Social History     Tobacco Use     Smoking status: Never Smoker     Smokeless tobacco: Never Used   Substance Use Topics     Alcohol use: Yes     Comment: occasionally       ROS: CONSTITUTIONAL:NEGATIVE for fever, chills, change in weight    OBJECTIVE:  BP (!) 156/93   Pulse 106   Temp 98.6  F (37  C) (Tympanic)    The right TM is normal: no effusions, no erythema, and normal landmarks     The right auditory canal is erythematous, swollen, tender  The left TM is normal: no effusions, no erythema, and normal landmarks  The left auditory canal is erythematous, swollen, tender  Oropharynx exam is normal: no lesions, erythema, adenopathy or exudate.GENERAL: no acute distress  EYES: EOMI,  PERRL, conjunctiva clear  SKIN: no suspicious lesions or rashes       ICD-10-CM    1. Infective otitis externa, bilateral  H60.393 neomycin-polymyxin-hydrocortisone (CORTISPORIN) [id number] otic suspension     Discontinue q tips  F/U PCP/IM/FP/UC if worse, not any better